# Patient Record
Sex: FEMALE
[De-identification: names, ages, dates, MRNs, and addresses within clinical notes are randomized per-mention and may not be internally consistent; named-entity substitution may affect disease eponyms.]

---

## 2019-06-28 PROBLEM — Z00.00 ENCOUNTER FOR PREVENTIVE HEALTH EXAMINATION: Status: ACTIVE | Noted: 2019-06-28

## 2019-07-19 ENCOUNTER — APPOINTMENT (OUTPATIENT)
Dept: ORTHOPEDIC SURGERY | Facility: CLINIC | Age: 34
End: 2019-07-19
Payer: COMMERCIAL

## 2019-07-19 VITALS — BODY MASS INDEX: 28.32 KG/M2 | WEIGHT: 150 LBS | HEIGHT: 61 IN

## 2019-07-19 DIAGNOSIS — M77.12 LATERAL EPICONDYLITIS, LEFT ELBOW: ICD-10-CM

## 2019-07-19 DIAGNOSIS — M75.42 IMPINGEMENT SYNDROME OF LEFT SHOULDER: ICD-10-CM

## 2019-07-19 DIAGNOSIS — Z60.2 PROBLEMS RELATED TO LIVING ALONE: ICD-10-CM

## 2019-07-19 DIAGNOSIS — Z82.61 FAMILY HISTORY OF ARTHRITIS: ICD-10-CM

## 2019-07-19 DIAGNOSIS — Z80.9 FAMILY HISTORY OF MALIGNANT NEOPLASM, UNSPECIFIED: ICD-10-CM

## 2019-07-19 PROCEDURE — 99204 OFFICE O/P NEW MOD 45 MIN: CPT

## 2019-07-19 RX ORDER — NORETHINDRONE ACETATE AND ETHINYL ESTRADIOL AND FERROUS FUMARATE 1MG-20(24)
KIT ORAL
Refills: 0 | Status: ACTIVE | COMMUNITY

## 2019-07-19 SDOH — SOCIAL STABILITY - SOCIAL INSECURITY: PROBLEMS RELATED TO LIVING ALONE: Z60.2

## 2019-07-22 NOTE — REASON FOR VISIT
[Initial Visit] : an initial visit for [FreeTextEntry2] : Left Greater than Right Elbow and Shoulder Pain for 2 months

## 2019-07-22 NOTE — DISCUSSION/SUMMARY
[Medication Risks Reviewed] : Medication risks reviewed [Surgical risks reviewed] : Surgical risks reviewed [de-identified] : Using a plastic shoulder model, I reviewed the anatomy of the rotator cuff muscles and the glenohumeral joint. I then explained the pathophysiology of impingement syndrome, commonly referred to as bursitis of the shoulder, and the fact that 80% of patients respond to nonoperative treatment.\par \par I then reviewed the nonoperative treatment program which includes activity modification to avoid those activities that exacerbate shoulder pain, Tylenol or anti-inflammatory medications as necessary, rehabilitation exercises focusing on shoulder stretching and rotator cuff muscle strengthening, either independently or with a physical therapist, and, a subacromial cortisone injection for those patients who remain symptomatic following an exercise program. \par \par Finally, I explained that those patients who remain symptomatic after a minimum of 3-4 months of nonoperative treatment and continue to have shoulder pain at night that interrupts sleep and compromises activities of everyday life or sporting activities requiring overhead use are candidates for surgical treatment.\par \par I then gave instructions in a home exercise program for the rotator cuff muscles emphasizing stretching and strengthening of the shoulder and also gave my home exercise sheet which includes illustrations of the exercises. I also reviewed a closed chain strengthening program that may be continued provided that these exercises are performed below shoulder level and at an intensity that does not cause pain. Many patients are cured on this simple rehabilitation exercise program alone.\par \par I explained the pathophysiology of tennis elbow and the fact that 90% of patients are cured with non-operative treatment which includes activity modification to avoid those activities that exacerbate pain, Tylenol or anti-inflammatory medications as required, and consideration of a tennis elbow band or wrist extension brace designed to rest the wrist extensor muscles that are irritated and which helps approximately 50% of patients.\par \par I also recommended gentle rehabilitation exercises focusing on stretching and strengthening of the wrist extensor muscles and gave the patient my home exercise sheet illustrating this program. Finally, I recommend a cortisone injection for those patients whose pain does not resolve following an exercise program and explained that this entire non-operative program may require up to 3-6 months of treatment before final outcome is achieved.\par \par Those patients who fail a thorough and complete non-operative treatment program, which accounts for only 10% of overall patients with tennis elbow, would benefit from surgical release of the common wrist extensor origin at the lateral epicondyle.\par \par She should return from my repeat evaluation in 2 months if she remains symptomatic at which time I would recommend a cortisone injection in her elbow and shoulder. She should continue taking Advil as necessary for pain relief.\par \par

## 2019-07-22 NOTE — PHYSICAL EXAM
[de-identified] : CONSTITUTIONAL: Patient is well-developed, well-nourished and appropriately groomed.\par SKIN: There are no rashes, no ulcers, and no café au lait spots in the upper extremities, face or cervical region.\par CARDIOVASCULAR: Both distal upper extremities are warm and the fingers have good capillary refill. Normal radial pulses bilaterally.\par RESPIRATORY: The patient is breathing normally and in no acute distress. \par HEMATOLOGICAL/LYMPHATIC: There is no lymphedema in either upper extremity. No cervical adenopathy, no axillary adenopathy.\par PSYCH: The patient is alert and oriented x 3;  appropriately groomed and dressed.\par NEUROLOGIC: Normal gait and station.\par MUSCULOSKELETAL:\par The left elbow has full active and passive range of motion with tenderness over the lateral epicondyles. She has pain with power  and resisted wrist extension. Distally the neurovascular examination is intact.\par \par The left shoulder has full active and passive range of motion with normal strength of external rotation. She has tenderness but no crepitus in the subacromial region, a positive arc pain, and a positive impingement sign.\par \par The contralateral shoulder has full, painless active and passive range of motion with 170° forward elevation, 70° external rotation, and internal rotation to the seventh thoracic vertebra. There is no focal tenderness and no crepitus with passive rotation of the glenohumeral joint.\par \par The cervical spine has full passive and active range of motion without pain. There is no focal tenderness in the paracervical muscles nor in the trapezius or parascapular muscles. Distally the motor and sensory exam is normal in both upper extremities.\par \par Distally, the hands are warm and well perfused with no signs of lymphedema or swelling. The radial pulse is present and normal.

## 2019-07-22 NOTE — HISTORY OF PRESENT ILLNESS
[de-identified] : Ms. Stevenson developed pain in her nondominant left shoulder and elbow 2 months ago without a specific injury. She has had to stop her normal exercise routine because of pain and states that she feels better following this period of rest. She is tried acupuncture which gave her temporary relief of pain and states that Advil helps but meloxicam has not helped her pain. 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

## 2019-10-02 PROBLEM — Z60.2 PERSON LIVING ALONE: Status: ACTIVE | Noted: 2019-07-19

## 2021-03-20 ENCOUNTER — APPOINTMENT (OUTPATIENT)
Age: 36
End: 2021-03-20
Payer: COMMERCIAL

## 2021-03-20 PROCEDURE — 0001A: CPT

## 2024-04-01 PROBLEM — Z00.00 ENCOUNTER FOR PREVENTIVE HEALTH EXAMINATION: Status: ACTIVE | Noted: 2024-04-01
